# Patient Record
Sex: MALE | Race: WHITE | NOT HISPANIC OR LATINO | Employment: STUDENT | URBAN - METROPOLITAN AREA
[De-identification: names, ages, dates, MRNs, and addresses within clinical notes are randomized per-mention and may not be internally consistent; named-entity substitution may affect disease eponyms.]

---
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
## 2020-12-28 ENCOUNTER — LAB VISIT (OUTPATIENT)
Dept: LAB | Facility: HOSPITAL | Age: 20
End: 2020-12-28
Attending: PEDIATRICS
Payer: COMMERCIAL

## 2020-12-28 DIAGNOSIS — E10.69 TYPE I DIABETES MELLITUS WITH HYPEROSMOLAR COMA: Primary | ICD-10-CM

## 2020-12-28 DIAGNOSIS — E06.3 CHRONIC LYMPHOCYTIC THYROIDITIS: ICD-10-CM

## 2020-12-28 DIAGNOSIS — E10.65 TYPE I DIABETES MELLITUS WITH HYPEROSMOLAR COMA: Primary | ICD-10-CM

## 2020-12-28 DIAGNOSIS — E87.0 TYPE I DIABETES MELLITUS WITH HYPEROSMOLAR COMA: Primary | ICD-10-CM

## 2020-12-28 LAB
ALBUMIN SERPL BCP-MCNC: 4.6 G/DL (ref 3.5–5.2)
ALBUMIN/CREAT UR: 9.1 UG/MG (ref 0–30)
ALP SERPL-CCNC: 62 U/L (ref 55–135)
ALT SERPL W/O P-5'-P-CCNC: 12 U/L (ref 10–44)
ANION GAP SERPL CALC-SCNC: 9 MMOL/L (ref 8–16)
AST SERPL-CCNC: 14 U/L (ref 10–40)
BACTERIA #/AREA URNS HPF: NORMAL /HPF
BILIRUB SERPL-MCNC: 0.9 MG/DL (ref 0.1–1)
BILIRUB UR QL STRIP: NEGATIVE
BUN SERPL-MCNC: 16 MG/DL (ref 6–20)
CALCIUM SERPL-MCNC: 10 MG/DL (ref 8.7–10.5)
CHLORIDE SERPL-SCNC: 103 MMOL/L (ref 95–110)
CHOLEST SERPL-MCNC: 227 MG/DL (ref 120–199)
CHOLEST/HDLC SERPL: 3.6 {RATIO} (ref 2–5)
CLARITY UR: CLEAR
CO2 SERPL-SCNC: 29 MMOL/L (ref 23–29)
COLOR UR: YELLOW
CREAT SERPL-MCNC: 0.9 MG/DL (ref 0.5–1.4)
CREAT UR-MCNC: 110 MG/DL (ref 23–375)
EST. GFR  (AFRICAN AMERICAN): >60 ML/MIN/1.73 M^2
EST. GFR  (NON AFRICAN AMERICAN): >60 ML/MIN/1.73 M^2
ESTIMATED AVG GLUCOSE: 192 MG/DL (ref 68–131)
GLUCOSE SERPL-MCNC: 89 MG/DL (ref 70–110)
GLUCOSE UR QL STRIP: ABNORMAL
HBA1C MFR BLD HPLC: 8.3 % (ref 4–5.6)
HDLC SERPL-MCNC: 63 MG/DL (ref 40–75)
HDLC SERPL: 27.8 % (ref 20–50)
HGB UR QL STRIP: NEGATIVE
IGA SERPL-MCNC: 98 MG/DL (ref 40–350)
KETONES UR QL STRIP: NEGATIVE
LDLC SERPL CALC-MCNC: 156.4 MG/DL (ref 63–159)
LEUKOCYTE ESTERASE UR QL STRIP: NEGATIVE
MICROALBUMIN UR DL<=1MG/L-MCNC: 10 UG/ML
MICROSCOPIC COMMENT: NORMAL
NITRITE UR QL STRIP: NEGATIVE
NONHDLC SERPL-MCNC: 164 MG/DL
PH UR STRIP: 6 [PH] (ref 5–8)
POTASSIUM SERPL-SCNC: 4.1 MMOL/L (ref 3.5–5.1)
PROT SERPL-MCNC: 7.4 G/DL (ref 6–8.4)
PROT UR QL STRIP: NEGATIVE
SODIUM SERPL-SCNC: 141 MMOL/L (ref 136–145)
SP GR UR STRIP: 1.02 (ref 1–1.03)
T4 FREE SERPL-MCNC: 0.96 NG/DL (ref 0.71–1.51)
TRIGL SERPL-MCNC: 38 MG/DL (ref 30–150)
TSH SERPL DL<=0.005 MIU/L-ACNC: 0.78 UIU/ML (ref 0.4–4)
URN SPEC COLLECT METH UR: ABNORMAL
UROBILINOGEN UR STRIP-ACNC: NEGATIVE EU/DL
YEAST URNS QL MICRO: NORMAL

## 2020-12-28 PROCEDURE — 80053 COMPREHEN METABOLIC PANEL: CPT

## 2020-12-28 PROCEDURE — 84439 ASSAY OF FREE THYROXINE: CPT

## 2020-12-28 PROCEDURE — 82784 ASSAY IGA/IGD/IGG/IGM EACH: CPT

## 2020-12-28 PROCEDURE — 82043 UR ALBUMIN QUANTITATIVE: CPT

## 2020-12-28 PROCEDURE — 83516 IMMUNOASSAY NONANTIBODY: CPT

## 2020-12-28 PROCEDURE — 80061 LIPID PANEL: CPT

## 2020-12-28 PROCEDURE — 81000 URINALYSIS NONAUTO W/SCOPE: CPT

## 2020-12-28 PROCEDURE — 83036 HEMOGLOBIN GLYCOSYLATED A1C: CPT

## 2020-12-28 PROCEDURE — 82570 ASSAY OF URINE CREATININE: CPT

## 2020-12-28 PROCEDURE — 84443 ASSAY THYROID STIM HORMONE: CPT

## 2020-12-30 LAB — TTG IGA SER-ACNC: 5 UNITS

## 2021-08-03 ENCOUNTER — LAB VISIT (OUTPATIENT)
Dept: LAB | Facility: HOSPITAL | Age: 21
End: 2021-08-03
Attending: PEDIATRICS
Payer: COMMERCIAL

## 2021-08-03 DIAGNOSIS — E10.65 TYPE I DIABETES MELLITUS WITH HYPEROSMOLAR COMA: Primary | ICD-10-CM

## 2021-08-03 DIAGNOSIS — E06.3 CHRONIC LYMPHOCYTIC THYROIDITIS: ICD-10-CM

## 2021-08-03 DIAGNOSIS — E10.69 TYPE I DIABETES MELLITUS WITH HYPEROSMOLAR COMA: Primary | ICD-10-CM

## 2021-08-03 DIAGNOSIS — E87.0 TYPE I DIABETES MELLITUS WITH HYPEROSMOLAR COMA: Primary | ICD-10-CM

## 2021-08-03 LAB
ALBUMIN SERPL BCP-MCNC: 4.3 G/DL (ref 3.5–5.2)
ALP SERPL-CCNC: 49 U/L (ref 55–135)
ALT SERPL W/O P-5'-P-CCNC: 14 U/L (ref 10–44)
ANION GAP SERPL CALC-SCNC: 10 MMOL/L (ref 8–16)
AST SERPL-CCNC: 13 U/L (ref 10–40)
BILIRUB SERPL-MCNC: 1 MG/DL (ref 0.1–1)
BUN SERPL-MCNC: 19 MG/DL (ref 6–20)
CALCIUM SERPL-MCNC: 9.9 MG/DL (ref 8.7–10.5)
CHLORIDE SERPL-SCNC: 103 MMOL/L (ref 95–110)
CHOLEST SERPL-MCNC: 220 MG/DL (ref 120–199)
CHOLEST/HDLC SERPL: 3.6 {RATIO} (ref 2–5)
CO2 SERPL-SCNC: 29 MMOL/L (ref 23–29)
CREAT SERPL-MCNC: 1 MG/DL (ref 0.5–1.4)
EST. GFR  (AFRICAN AMERICAN): >60 ML/MIN/1.73 M^2
EST. GFR  (NON AFRICAN AMERICAN): >60 ML/MIN/1.73 M^2
ESTIMATED AVG GLUCOSE: 220 MG/DL (ref 68–131)
GLUCOSE SERPL-MCNC: 138 MG/DL (ref 70–110)
HBA1C MFR BLD: 9.3 % (ref 4–5.6)
HDLC SERPL-MCNC: 61 MG/DL (ref 40–75)
HDLC SERPL: 27.7 % (ref 20–50)
IGA SERPL-MCNC: 115 MG/DL (ref 40–350)
LDLC SERPL CALC-MCNC: 146.6 MG/DL (ref 63–159)
NONHDLC SERPL-MCNC: 159 MG/DL
POTASSIUM SERPL-SCNC: 3.8 MMOL/L (ref 3.5–5.1)
PROT SERPL-MCNC: 6.8 G/DL (ref 6–8.4)
SODIUM SERPL-SCNC: 142 MMOL/L (ref 136–145)
T4 FREE SERPL-MCNC: 0.92 NG/DL (ref 0.71–1.51)
THYROGLOB AB SERPL IA-ACNC: 28.8 IU/ML (ref 0–3.9)
THYROPEROXIDASE IGG SERPL-ACNC: 35.8 IU/ML
TRIGL SERPL-MCNC: 62 MG/DL (ref 30–150)
TSH SERPL DL<=0.005 MIU/L-ACNC: 1.32 UIU/ML (ref 0.4–4)

## 2021-08-03 PROCEDURE — 83036 HEMOGLOBIN GLYCOSYLATED A1C: CPT | Performed by: PEDIATRICS

## 2021-08-03 PROCEDURE — 84439 ASSAY OF FREE THYROXINE: CPT | Performed by: PEDIATRICS

## 2021-08-03 PROCEDURE — 83516 IMMUNOASSAY NONANTIBODY: CPT | Performed by: PEDIATRICS

## 2021-08-03 PROCEDURE — 84443 ASSAY THYROID STIM HORMONE: CPT | Performed by: PEDIATRICS

## 2021-08-03 PROCEDURE — 82784 ASSAY IGA/IGD/IGG/IGM EACH: CPT | Performed by: PEDIATRICS

## 2021-08-03 PROCEDURE — 80061 LIPID PANEL: CPT | Performed by: PEDIATRICS

## 2021-08-03 PROCEDURE — 80053 COMPREHEN METABOLIC PANEL: CPT | Performed by: PEDIATRICS

## 2021-08-03 PROCEDURE — 86800 THYROGLOBULIN ANTIBODY: CPT | Performed by: PEDIATRICS

## 2021-08-03 PROCEDURE — 36415 COLL VENOUS BLD VENIPUNCTURE: CPT | Performed by: PEDIATRICS

## 2021-08-03 PROCEDURE — 86376 MICROSOMAL ANTIBODY EACH: CPT | Performed by: PEDIATRICS

## 2021-08-06 LAB — TTG IGA SER-ACNC: 7 UNITS

## 2023-12-13 NOTE — PROGRESS NOTES
"[facility]   OFFICE VISIT NOTE    Patient Name: Dario Shepherd  YOB: 2000    PRESENTING HISTORY     History of Present Illness:  Mr. Dario Shepherd is a 23 y.o. male here to establish care  History notable for type 1 diabetes mellitus which was diagnosed when he was about 12 years old.    Used to follow up pediatrician as well as pediatric endocrinologist, however, due to his age, he was advised to follow up with all adult provider as well as adult endocrinologist.    He has insulin pump and uses Humalog 1 units for every 10 g of carbs he consumes.   Denies any recent hypoglycemic events. Last one was about 5 years ago.  Denies any vision changes, polyphagia, polydipsia, or polyuria.    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    Not currently sexually active and declines STD testing today.    Denies smoking or illicit drug use  Admits drinking on weekends -- couple of beers     Review of Systems   Constitutional:  Negative for chills, diaphoresis, fatigue and fever.   Respiratory:  Negative for cough, shortness of breath and wheezing.    Cardiovascular:  Negative for chest pain and palpitations.   Gastrointestinal:  Negative for constipation, diarrhea, nausea and vomiting.   Genitourinary:  Negative for bladder incontinence.   Neurological:  Negative for coordination difficulties.   Psychiatric/Behavioral:  Negative for behavioral problems.           OBJECTIVE:   Vital Signs:  Vitals:    12/14/23 0759   BP: 110/74   Pulse: 88   Resp: 18   SpO2: 99%   Weight: 74.1 kg (163 lb 5.8 oz)   Height: 5' 9" (1.753 m)         Physical Exam  Constitutional:       General: He is not in acute distress.     Appearance: He is not ill-appearing or toxic-appearing.   HENT:      Head: Normocephalic and atraumatic.      Mouth/Throat:      Mouth: Mucous membranes " are moist.      Pharynx: Uvula midline. No pharyngeal swelling.   Cardiovascular:      Rate and Rhythm: Normal rate and regular rhythm.   Pulmonary:      Effort: Pulmonary effort is normal. No tachypnea, bradypnea, accessory muscle usage, prolonged expiration or respiratory distress.      Breath sounds: Normal breath sounds. No stridor. No wheezing, rhonchi or rales.   Neurological:      General: No focal deficit present.      Mental Status: He is alert.   Psychiatric:         Mood and Affect: Mood normal.         Behavior: Behavior normal.         ASSESSMENT & PLAN:     Routine general medical examination at a health care facility  -     CBC Without Differential; Future; Expected date: 12/14/2023  -     Comprehensive Metabolic Panel; Future; Expected date: 12/14/2023  -     Hemoglobin A1C; Future; Expected date: 12/14/2023  -     Lipid Panel; Future; Expected date: 12/14/2023  A standard drink is equal to 14.0 grams (0.6 ounces) of pure alcohol. Generally, this amount of pure alcohol is found in  12 ounces of beer (5% alcohol content).  8 ounces of malt liquor (7% alcohol content).  5 ounces of wine (12% alcohol content).  1.5 ounces or a shot of 80-proof (40% alcohol content) distilled spirits or liquor (e.g., gin, rum, vodka, whiskey).    According to the Dietary Guidelines for Americans, adults of legal drinking age can choose not to drink, or to drink in moderation by limiting intake to 2 drinks or less in a day for men and 1 drink or less in a day for women, when alcohol is consumed.     Type 1 diabetes mellitus with hyperglycemia  -     Hemoglobin A1C; Future; Expected date: 12/14/2023  -     Lipid Panel; Future; Expected date: 12/14/2023  -     Ambulatory referral/consult to Endocrinology; Future; Expected date: 12/21/2023    Continue with insulin pump and humalog as needed   Prescribed glucagon emergency kit for the patient   Endocrinology referral  Informed the patient that it may be several months  waiting until he is seen by endocrinology -- will utilize e-consult if needed     Other orders  -     insulin lispro (HUMALOG U-100 INSULIN) 100 unit/mL injection; Inject 1 unit subcutaneously for 10 g of carbohydrates  Dispense: 10 mL; Refill: 10  -     glucagon HCL (GLUCAGON, HCL, EMERGENCY KIT) 1 mg SolR; Inject 1 mg subcutaneously into the upper arm, thigh, or buttocks for hypoglycemia (glucose < 60)  Dispense: 1 each; Refill: 2         Janell Richardson MD  Family Medicine  [facility]     This note was created using [employer] voice recognition software that occasionally misinterprets phrases or words

## 2023-12-14 ENCOUNTER — LAB VISIT (OUTPATIENT)
Dept: LAB | Facility: HOSPITAL | Age: 23
End: 2023-12-14
Attending: STUDENT IN AN ORGANIZED HEALTH CARE EDUCATION/TRAINING PROGRAM
Payer: COMMERCIAL

## 2023-12-14 ENCOUNTER — TELEPHONE (OUTPATIENT)
Dept: ENDOCRINOLOGY | Facility: CLINIC | Age: 23
End: 2023-12-14
Payer: COMMERCIAL

## 2023-12-14 ENCOUNTER — OFFICE VISIT (OUTPATIENT)
Dept: FAMILY MEDICINE | Facility: CLINIC | Age: 23
End: 2023-12-14
Payer: COMMERCIAL

## 2023-12-14 VITALS
SYSTOLIC BLOOD PRESSURE: 110 MMHG | HEART RATE: 88 BPM | WEIGHT: 163.38 LBS | OXYGEN SATURATION: 99 % | DIASTOLIC BLOOD PRESSURE: 74 MMHG | HEIGHT: 69 IN | RESPIRATION RATE: 18 BRPM | BODY MASS INDEX: 24.2 KG/M2

## 2023-12-14 DIAGNOSIS — E10.65 TYPE 1 DIABETES MELLITUS WITH HYPERGLYCEMIA: ICD-10-CM

## 2023-12-14 DIAGNOSIS — Z00.00 ROUTINE GENERAL MEDICAL EXAMINATION AT A HEALTH CARE FACILITY: ICD-10-CM

## 2023-12-14 DIAGNOSIS — Z00.00 ROUTINE GENERAL MEDICAL EXAMINATION AT A HEALTH CARE FACILITY: Primary | ICD-10-CM

## 2023-12-14 LAB
ALBUMIN SERPL BCP-MCNC: 4.2 G/DL (ref 3.5–5.2)
ALP SERPL-CCNC: 47 U/L (ref 55–135)
ALT SERPL W/O P-5'-P-CCNC: 30 U/L (ref 10–44)
ANION GAP SERPL CALC-SCNC: 11 MMOL/L (ref 8–16)
AST SERPL-CCNC: 23 U/L (ref 10–40)
BILIRUB SERPL-MCNC: 1.3 MG/DL (ref 0.1–1)
BUN SERPL-MCNC: 16 MG/DL (ref 6–20)
CALCIUM SERPL-MCNC: 10.4 MG/DL (ref 8.7–10.5)
CHLORIDE SERPL-SCNC: 100 MMOL/L (ref 95–110)
CHOLEST SERPL-MCNC: 201 MG/DL (ref 120–199)
CHOLEST/HDLC SERPL: 3 {RATIO} (ref 2–5)
CO2 SERPL-SCNC: 30 MMOL/L (ref 23–29)
CREAT SERPL-MCNC: 1 MG/DL (ref 0.5–1.4)
ERYTHROCYTE [DISTWIDTH] IN BLOOD BY AUTOMATED COUNT: 12.4 % (ref 11.5–14.5)
EST. GFR  (NO RACE VARIABLE): >60 ML/MIN/1.73 M^2
ESTIMATED AVG GLUCOSE: 203 MG/DL (ref 68–131)
GLUCOSE SERPL-MCNC: 156 MG/DL (ref 70–110)
HBA1C MFR BLD: 8.7 % (ref 4–5.6)
HCT VFR BLD AUTO: 52.7 % (ref 40–54)
HDLC SERPL-MCNC: 67 MG/DL (ref 40–75)
HDLC SERPL: 33.3 % (ref 20–50)
HGB BLD-MCNC: 17.7 G/DL (ref 14–18)
LDLC SERPL CALC-MCNC: 122.4 MG/DL (ref 63–159)
MCH RBC QN AUTO: 30.7 PG (ref 27–31)
MCHC RBC AUTO-ENTMCNC: 33.6 G/DL (ref 32–36)
MCV RBC AUTO: 91 FL (ref 82–98)
NONHDLC SERPL-MCNC: 134 MG/DL
PLATELET # BLD AUTO: 247 K/UL (ref 150–450)
PMV BLD AUTO: 11.9 FL (ref 9.2–12.9)
POTASSIUM SERPL-SCNC: 4.1 MMOL/L (ref 3.5–5.1)
PROT SERPL-MCNC: 7.1 G/DL (ref 6–8.4)
RBC # BLD AUTO: 5.77 M/UL (ref 4.6–6.2)
SODIUM SERPL-SCNC: 141 MMOL/L (ref 136–145)
TRIGL SERPL-MCNC: 58 MG/DL (ref 30–150)
WBC # BLD AUTO: 6.67 K/UL (ref 3.9–12.7)

## 2023-12-14 PROCEDURE — 80061 LIPID PANEL: CPT | Performed by: STUDENT IN AN ORGANIZED HEALTH CARE EDUCATION/TRAINING PROGRAM

## 2023-12-14 PROCEDURE — 3078F DIAST BP <80 MM HG: CPT | Mod: CPTII,S$GLB,, | Performed by: STUDENT IN AN ORGANIZED HEALTH CARE EDUCATION/TRAINING PROGRAM

## 2023-12-14 PROCEDURE — 80053 COMPREHEN METABOLIC PANEL: CPT | Performed by: STUDENT IN AN ORGANIZED HEALTH CARE EDUCATION/TRAINING PROGRAM

## 2023-12-14 PROCEDURE — 83036 HEMOGLOBIN GLYCOSYLATED A1C: CPT | Performed by: STUDENT IN AN ORGANIZED HEALTH CARE EDUCATION/TRAINING PROGRAM

## 2023-12-14 PROCEDURE — 99999 PR PBB SHADOW E&M-EST. PATIENT-LVL IV: ICD-10-PCS | Mod: PBBFAC,,, | Performed by: STUDENT IN AN ORGANIZED HEALTH CARE EDUCATION/TRAINING PROGRAM

## 2023-12-14 PROCEDURE — 85027 COMPLETE CBC AUTOMATED: CPT | Performed by: STUDENT IN AN ORGANIZED HEALTH CARE EDUCATION/TRAINING PROGRAM

## 2023-12-14 PROCEDURE — 1159F MED LIST DOCD IN RCRD: CPT | Mod: CPTII,S$GLB,, | Performed by: STUDENT IN AN ORGANIZED HEALTH CARE EDUCATION/TRAINING PROGRAM

## 2023-12-14 PROCEDURE — 99385 PREV VISIT NEW AGE 18-39: CPT | Mod: S$GLB,,, | Performed by: STUDENT IN AN ORGANIZED HEALTH CARE EDUCATION/TRAINING PROGRAM

## 2023-12-14 PROCEDURE — 3074F SYST BP LT 130 MM HG: CPT | Mod: CPTII,S$GLB,, | Performed by: STUDENT IN AN ORGANIZED HEALTH CARE EDUCATION/TRAINING PROGRAM

## 2023-12-14 PROCEDURE — 1160F PR REVIEW ALL MEDS BY PRESCRIBER/CLIN PHARMACIST DOCUMENTED: ICD-10-PCS | Mod: CPTII,S$GLB,, | Performed by: STUDENT IN AN ORGANIZED HEALTH CARE EDUCATION/TRAINING PROGRAM

## 2023-12-14 PROCEDURE — 1160F RVW MEDS BY RX/DR IN RCRD: CPT | Mod: CPTII,S$GLB,, | Performed by: STUDENT IN AN ORGANIZED HEALTH CARE EDUCATION/TRAINING PROGRAM

## 2023-12-14 PROCEDURE — 3074F PR MOST RECENT SYSTOLIC BLOOD PRESSURE < 130 MM HG: ICD-10-PCS | Mod: CPTII,S$GLB,, | Performed by: STUDENT IN AN ORGANIZED HEALTH CARE EDUCATION/TRAINING PROGRAM

## 2023-12-14 PROCEDURE — 99385 PR PREVENTIVE VISIT,NEW,18-39: ICD-10-PCS | Mod: S$GLB,,, | Performed by: STUDENT IN AN ORGANIZED HEALTH CARE EDUCATION/TRAINING PROGRAM

## 2023-12-14 PROCEDURE — 99999 PR PBB SHADOW E&M-EST. PATIENT-LVL IV: CPT | Mod: PBBFAC,,, | Performed by: STUDENT IN AN ORGANIZED HEALTH CARE EDUCATION/TRAINING PROGRAM

## 2023-12-14 PROCEDURE — 3078F PR MOST RECENT DIASTOLIC BLOOD PRESSURE < 80 MM HG: ICD-10-PCS | Mod: CPTII,S$GLB,, | Performed by: STUDENT IN AN ORGANIZED HEALTH CARE EDUCATION/TRAINING PROGRAM

## 2023-12-14 PROCEDURE — 1159F PR MEDICATION LIST DOCUMENTED IN MEDICAL RECORD: ICD-10-PCS | Mod: CPTII,S$GLB,, | Performed by: STUDENT IN AN ORGANIZED HEALTH CARE EDUCATION/TRAINING PROGRAM

## 2023-12-14 PROCEDURE — 3008F BODY MASS INDEX DOCD: CPT | Mod: CPTII,S$GLB,, | Performed by: STUDENT IN AN ORGANIZED HEALTH CARE EDUCATION/TRAINING PROGRAM

## 2023-12-14 PROCEDURE — 36415 COLL VENOUS BLD VENIPUNCTURE: CPT | Mod: PO | Performed by: STUDENT IN AN ORGANIZED HEALTH CARE EDUCATION/TRAINING PROGRAM

## 2023-12-14 PROCEDURE — 3008F PR BODY MASS INDEX (BMI) DOCUMENTED: ICD-10-PCS | Mod: CPTII,S$GLB,, | Performed by: STUDENT IN AN ORGANIZED HEALTH CARE EDUCATION/TRAINING PROGRAM

## 2023-12-14 RX ORDER — INSULIN LISPRO 100 [IU]/ML
INJECTION, SOLUTION INTRAVENOUS; SUBCUTANEOUS
Qty: 10 ML | Refills: 10 | Status: SHIPPED | OUTPATIENT
Start: 2023-12-14

## 2023-12-14 RX ORDER — GLUCAGON HCL 1 MG
VIAL (EA) INJECTION
Qty: 1 EACH | Refills: 2 | Status: SHIPPED | OUTPATIENT
Start: 2023-12-14

## 2023-12-14 RX ORDER — INSULIN PUMP CONTROLLER
EACH MISCELLANEOUS
COMMUNITY
Start: 2023-09-27

## 2023-12-14 RX ORDER — INSULIN LISPRO 100 [IU]/ML
INJECTION, SOLUTION INTRAVENOUS; SUBCUTANEOUS
COMMUNITY
Start: 2023-10-30 | End: 2023-12-14

## 2023-12-14 NOTE — PATIENT INSTRUCTIONS
Marcio Bishop,     If you are due for any health screening(s) below please notify me so we can arrange them to be ordered and scheduled. Most healthy patients at your age complete them, but you are free to accept or refuse.     If you can't do it, I'll definitely understand. If you can, I'd certainly appreciate it!    All of your core healthy metrics are met.      Lets manage your A1c levels     Your last hemoglobin A1c was higher than the goal of less than 8. Hemoglobin A1c or HbA1c is a blood test that measures your average blood sugar levels over the past 3 months. It is the main test to help you and your health care team manage your diabetes.     Higher A1c levels are linked to diabetes complications, such as loss of vision, kidney disease, and nerve damage. Keeping your A1c at least less than 8 is important to stay healthy and we are here to help. Talk with your provider on how you can further improve your A1c.     We recommend that you set up blood work to get a repeat hemoglobin A1c in 3 months to monitor your diabetes. Let your health care team know if you have questions.

## 2023-12-14 NOTE — TELEPHONE ENCOUNTER
Endo referral: patient's phones are mom/dad. Scheduled with Emily acevedo: 3/7/2024 [address]. No portal access. Mom asks for email sent. Explained however patient has notifications set up, he'll receive appointment information in that manner. Offered to call her back and leave voicemail with appointment information. She accepts; voicemail left.

## 2023-12-19 ENCOUNTER — TELEPHONE (OUTPATIENT)
Dept: FAMILY MEDICINE | Facility: CLINIC | Age: 23
End: 2023-12-19
Payer: COMMERCIAL

## 2023-12-19 DIAGNOSIS — E10.65 TYPE 1 DIABETES MELLITUS WITH HYPERGLYCEMIA: Primary | ICD-10-CM

## 2023-12-19 RX ORDER — GLUCAGON 3 MG/1
1 POWDER NASAL ONCE
Qty: 1 EACH | Refills: 1 | Status: SHIPPED | OUTPATIENT
Start: 2023-12-19 | End: 2023-12-19

## 2023-12-19 NOTE — TELEPHONE ENCOUNTER
Called the patient to let him know that glucagon emergency kit was not covered through the insurance and that I sent in the alternative (glucagon nasal spray) to his pharmacy.  He did not  so I left a voicemail.

## 2024-05-17 ENCOUNTER — OFFICE VISIT (OUTPATIENT)
Dept: FAMILY MEDICINE | Facility: CLINIC | Age: 24
End: 2024-05-17
Payer: COMMERCIAL

## 2024-05-17 ENCOUNTER — TELEPHONE (OUTPATIENT)
Dept: FAMILY MEDICINE | Facility: CLINIC | Age: 24
End: 2024-05-17

## 2024-05-17 ENCOUNTER — LAB VISIT (OUTPATIENT)
Dept: LAB | Facility: HOSPITAL | Age: 24
End: 2024-05-17
Attending: STUDENT IN AN ORGANIZED HEALTH CARE EDUCATION/TRAINING PROGRAM
Payer: COMMERCIAL

## 2024-05-17 VITALS
OXYGEN SATURATION: 98 % | WEIGHT: 171.75 LBS | BODY MASS INDEX: 25.44 KG/M2 | SYSTOLIC BLOOD PRESSURE: 138 MMHG | HEIGHT: 69 IN | HEART RATE: 103 BPM | RESPIRATION RATE: 16 BRPM | DIASTOLIC BLOOD PRESSURE: 76 MMHG

## 2024-05-17 DIAGNOSIS — E10.65 TYPE 1 DIABETES MELLITUS WITH HYPERGLYCEMIA: ICD-10-CM

## 2024-05-17 DIAGNOSIS — R17 HIGH BILIRUBIN: ICD-10-CM

## 2024-05-17 DIAGNOSIS — E10.65 TYPE 1 DIABETES MELLITUS WITH HYPERGLYCEMIA: Primary | ICD-10-CM

## 2024-05-17 LAB
ALBUMIN SERPL BCP-MCNC: 4.3 G/DL (ref 3.5–5.2)
ALP SERPL-CCNC: 51 U/L (ref 55–135)
ALT SERPL W/O P-5'-P-CCNC: 19 U/L (ref 10–44)
ANION GAP SERPL CALC-SCNC: 11 MMOL/L (ref 8–16)
AST SERPL-CCNC: 15 U/L (ref 10–40)
BILIRUB DIRECT SERPL-MCNC: 0.2 MG/DL (ref 0.1–0.3)
BILIRUB SERPL-MCNC: 0.4 MG/DL (ref 0.1–1)
BUN SERPL-MCNC: 14 MG/DL (ref 6–20)
CALCIUM SERPL-MCNC: 9.9 MG/DL (ref 8.7–10.5)
CHLORIDE SERPL-SCNC: 100 MMOL/L (ref 95–110)
CO2 SERPL-SCNC: 26 MMOL/L (ref 23–29)
CREAT SERPL-MCNC: 1.2 MG/DL (ref 0.5–1.4)
EST. GFR  (NO RACE VARIABLE): >60 ML/MIN/1.73 M^2
ESTIMATED AVG GLUCOSE: 183 MG/DL (ref 68–131)
GLUCOSE SERPL-MCNC: 282 MG/DL (ref 70–110)
HBA1C MFR BLD: 8 % (ref 4–5.6)
POTASSIUM SERPL-SCNC: 4.2 MMOL/L (ref 3.5–5.1)
PROT SERPL-MCNC: 7 G/DL (ref 6–8.4)
SODIUM SERPL-SCNC: 137 MMOL/L (ref 136–145)

## 2024-05-17 PROCEDURE — 36415 COLL VENOUS BLD VENIPUNCTURE: CPT | Mod: PO | Performed by: STUDENT IN AN ORGANIZED HEALTH CARE EDUCATION/TRAINING PROGRAM

## 2024-05-17 PROCEDURE — 3008F BODY MASS INDEX DOCD: CPT | Mod: CPTII,S$GLB,, | Performed by: STUDENT IN AN ORGANIZED HEALTH CARE EDUCATION/TRAINING PROGRAM

## 2024-05-17 PROCEDURE — 99213 OFFICE O/P EST LOW 20 MIN: CPT | Mod: S$GLB,,, | Performed by: STUDENT IN AN ORGANIZED HEALTH CARE EDUCATION/TRAINING PROGRAM

## 2024-05-17 PROCEDURE — 80053 COMPREHEN METABOLIC PANEL: CPT | Performed by: STUDENT IN AN ORGANIZED HEALTH CARE EDUCATION/TRAINING PROGRAM

## 2024-05-17 PROCEDURE — 1160F RVW MEDS BY RX/DR IN RCRD: CPT | Mod: CPTII,S$GLB,, | Performed by: STUDENT IN AN ORGANIZED HEALTH CARE EDUCATION/TRAINING PROGRAM

## 2024-05-17 PROCEDURE — 99999 PR PBB SHADOW E&M-EST. PATIENT-LVL IV: CPT | Mod: PBBFAC,,, | Performed by: STUDENT IN AN ORGANIZED HEALTH CARE EDUCATION/TRAINING PROGRAM

## 2024-05-17 PROCEDURE — 3075F SYST BP GE 130 - 139MM HG: CPT | Mod: CPTII,S$GLB,, | Performed by: STUDENT IN AN ORGANIZED HEALTH CARE EDUCATION/TRAINING PROGRAM

## 2024-05-17 PROCEDURE — 3078F DIAST BP <80 MM HG: CPT | Mod: CPTII,S$GLB,, | Performed by: STUDENT IN AN ORGANIZED HEALTH CARE EDUCATION/TRAINING PROGRAM

## 2024-05-17 PROCEDURE — 82248 BILIRUBIN DIRECT: CPT | Performed by: STUDENT IN AN ORGANIZED HEALTH CARE EDUCATION/TRAINING PROGRAM

## 2024-05-17 PROCEDURE — 83036 HEMOGLOBIN GLYCOSYLATED A1C: CPT | Performed by: STUDENT IN AN ORGANIZED HEALTH CARE EDUCATION/TRAINING PROGRAM

## 2024-05-17 PROCEDURE — 1159F MED LIST DOCD IN RCRD: CPT | Mod: CPTII,S$GLB,, | Performed by: STUDENT IN AN ORGANIZED HEALTH CARE EDUCATION/TRAINING PROGRAM

## 2024-05-17 RX ORDER — INSULIN PUMP CONTROLLER
1 EACH MISCELLANEOUS
Qty: 50 EACH | Refills: 2 | Status: SHIPPED | OUTPATIENT
Start: 2024-05-17 | End: 2024-05-20 | Stop reason: SDUPTHER

## 2024-05-17 RX ORDER — INSULIN LISPRO 100 [IU]/ML
INJECTION, SOLUTION INTRAVENOUS; SUBCUTANEOUS
Qty: 30 ML | Refills: 3 | Status: SHIPPED | OUTPATIENT
Start: 2024-05-17 | End: 2024-05-20 | Stop reason: SDUPTHER

## 2024-05-17 NOTE — TELEPHONE ENCOUNTER
Please see pharmacy note   Jessie needs to clarify the max daily amount of units for patients insulin  Please advise      ----- Message from Feliz Cuevas sent at 5/17/2024  2:49 PM CDT -----  Contact: Jessie  Type: Needs Medical Advice  Who Called:  Jessie    Pharmacy name and phone #:    [id number] - [place] - [address] [facility] [address]  [facility] [place] [address] [address]  Phone: [phone] Fax: [phone]    Additional Information: Jessie needs to clarify the max daily amount of units for patients insulin

## 2024-05-17 NOTE — TELEPHONE ENCOUNTER
Patient returned call to office. Call transferred directly to writer per patient access representative Mac Tellez.  Patient states he received Lispro through [facility] (only method of receiving this medication).  Patient confirmed his maximum daily dosage is 65 units.  Will send follow up message to Dr. Richardson for notification.  Call placed to pharmacy, spoke to pharmacist (Haydee) for clarification of order as stated above.

## 2024-05-17 NOTE — TELEPHONE ENCOUNTER
I tried calling him on 5/17 afternoon but was not able to reach him I left a   His phone is [phone]    Please call and ask him maximum insulin lispro he is using per day (he is doing 1 unit for every 10 g of carbs).    Also ask maximum insulin he is getting a dayon omnipod.    Thank you,  Janell Richardson MD

## 2024-05-17 NOTE — PROGRESS NOTES
"[facility] [place]   OFFICE VISIT NOTE    Patient Name: Dario Shepherd  YOB: 2000    PRESENTING HISTORY     History of Present Illness:  Mr. Dario Shepherd is a 23 y.o. male here for medication refill for type 1 diabetes     Review of Systems   Constitutional:  Negative for chills and fever.   Respiratory:  Negative for shortness of breath.    Cardiovascular:  Negative for chest pain.   Gastrointestinal:  Negative for blood in stool.   Endocrine: Negative for polydipsia, polyphagia and polyuria.   Genitourinary:  Negative for hematuria.          OBJECTIVE:   Vital Signs:  Vitals:    05/17/24 1345   BP: 138/76   Pulse: 103   Resp: 16   SpO2: 98%   Weight: 77.9 kg (171 lb 11.8 oz)   Height: 5' 9" (1.753 m)           Physical Exam  Constitutional:       General: He is not in acute distress.     Appearance: He is not ill-appearing or toxic-appearing.   HENT:      Head: Normocephalic and atraumatic.      Mouth/Throat:      Mouth: Mucous membranes are moist.      Pharynx: Uvula midline. No pharyngeal swelling.   Cardiovascular:      Rate and Rhythm: Normal rate and regular rhythm.   Pulmonary:      Effort: Pulmonary effort is normal. No tachypnea, bradypnea, accessory muscle usage, prolonged expiration or respiratory distress.      Breath sounds: Normal breath sounds. No stridor. No wheezing, rhonchi or rales.   Abdominal:      General: Bowel sounds are normal.      Palpations: Abdomen is soft.      Tenderness: There is no abdominal tenderness. There is no guarding or rebound.   Neurological:      General: No focal deficit present.      Mental Status: He is alert.   Psychiatric:         Mood and Affect: Mood normal.         Behavior: Behavior normal.         ASSESSMENT & PLAN:     Type 1 diabetes mellitus with hyperglycemia  -     HEMOGLOBIN A1C; Future; Expected date: 05/17/2024  -     COMPREHENSIVE METABOLIC PANEL; Future; Expected date: 05/17/2024  -     OMNIPOD DASH PODS, GEN 4, Crtg; " Inject 1 each into the skin every 7 days.  Dispense: 50 each; Refill: 2  -     insulin lispro (HUMALOG U-100 INSULIN) 100 unit/mL injection; Inject 1 unit subcutaneously for 10 g of carbohydrates  Dispense: 30 mL; Refill: 3  Patient has been using his insulin pump   Denies any hypoglycemic events  Denies any polyphagia, polydipsia, polyuria, or vision changes   Advised the patient to get routine eye exam     High bilirubin  -     COMPREHENSIVE METABOLIC PANEL; Future; Expected date: 05/17/2024  -     BILIRUBIN, DIRECT; Future; Expected date: 05/17/2024  On the last lab work, bilirubin was slightly elevated  Will repeat the bilirubin level today     Follow up in 6 months or sooner if needed        Janell Richardson MD  Family Medicine  [facility]     This note was created using [employer] voice recognition software that occasionally misinterprets phrases or words

## 2024-05-20 RX ORDER — INSULIN PUMP CONTROLLER
1 EACH MISCELLANEOUS
Qty: 50 EACH | Refills: 6 | Status: SHIPPED | OUTPATIENT
Start: 2024-05-20

## 2024-05-20 RX ORDER — INSULIN LISPRO 100 [IU]/ML
INJECTION, SOLUTION INTRAVENOUS; SUBCUTANEOUS
Qty: 30 ML | Refills: 3 | Status: SHIPPED | OUTPATIENT
Start: 2024-05-20

## 2024-05-21 ENCOUNTER — TELEPHONE (OUTPATIENT)
Dept: FAMILY MEDICINE | Facility: CLINIC | Age: 24
End: 2024-05-21
Payer: COMMERCIAL

## 2024-05-21 NOTE — TELEPHONE ENCOUNTER
Spoke to patient   Notified that his insulin and Pods were sent to [facility] yesterday   Endo referral is in computer   Message sent to endo to assist with appt      ----- Message from Chin Dillard sent at 5/21/2024  2:42 PM CDT -----  Contact: Mom  Type:  Patient Returning Call    Who Called:  Mom  Who Left Message for Patient:  Carrie  Does the patient know what this is regarding?:  Yes  Best Call Back Number:  [phone]  Additional Information:

## 2024-07-30 ENCOUNTER — OFFICE VISIT (OUTPATIENT)
Dept: ENDOCRINOLOGY | Facility: CLINIC | Age: 24
End: 2024-07-30
Payer: COMMERCIAL

## 2024-07-30 VITALS
HEIGHT: 69 IN | DIASTOLIC BLOOD PRESSURE: 82 MMHG | BODY MASS INDEX: 25.44 KG/M2 | SYSTOLIC BLOOD PRESSURE: 142 MMHG | OXYGEN SATURATION: 97 % | WEIGHT: 171.75 LBS | HEART RATE: 86 BPM

## 2024-07-30 DIAGNOSIS — E10.65 TYPE 1 DIABETES MELLITUS WITH HYPERGLYCEMIA: Primary | ICD-10-CM

## 2024-07-30 DIAGNOSIS — Z46.81 INSULIN PUMP FITTING OR ADJUSTMENT: ICD-10-CM

## 2024-07-30 DIAGNOSIS — Z96.41 INSULIN PUMP STATUS: ICD-10-CM

## 2024-07-30 PROCEDURE — 3008F BODY MASS INDEX DOCD: CPT | Mod: CPTII,S$GLB,, | Performed by: PHYSICIAN ASSISTANT

## 2024-07-30 PROCEDURE — 99999 PR PBB SHADOW E&M-EST. PATIENT-LVL III: CPT | Mod: PBBFAC,,, | Performed by: PHYSICIAN ASSISTANT

## 2024-07-30 PROCEDURE — 3052F HG A1C>EQUAL 8.0%<EQUAL 9.0%: CPT | Mod: CPTII,S$GLB,, | Performed by: PHYSICIAN ASSISTANT

## 2024-07-30 PROCEDURE — 1159F MED LIST DOCD IN RCRD: CPT | Mod: CPTII,S$GLB,, | Performed by: PHYSICIAN ASSISTANT

## 2024-07-30 PROCEDURE — 3079F DIAST BP 80-89 MM HG: CPT | Mod: CPTII,S$GLB,, | Performed by: PHYSICIAN ASSISTANT

## 2024-07-30 PROCEDURE — 99204 OFFICE O/P NEW MOD 45 MIN: CPT | Mod: S$GLB,,, | Performed by: PHYSICIAN ASSISTANT

## 2024-07-30 PROCEDURE — 3077F SYST BP >= 140 MM HG: CPT | Mod: CPTII,S$GLB,, | Performed by: PHYSICIAN ASSISTANT

## 2024-07-30 PROCEDURE — 1160F RVW MEDS BY RX/DR IN RCRD: CPT | Mod: CPTII,S$GLB,, | Performed by: PHYSICIAN ASSISTANT

## 2024-07-30 RX ORDER — GLUCAGON 3 MG/1
POWDER NASAL
Qty: 2 EACH | Refills: 0 | Status: SHIPPED | OUTPATIENT
Start: 2024-07-30

## 2024-07-30 RX ORDER — INSULIN GLARGINE 100 [IU]/ML
INJECTION, SOLUTION SUBCUTANEOUS
Qty: 30 ML | Refills: 1 | Status: SHIPPED | OUTPATIENT
Start: 2024-07-30

## 2024-07-30 RX ORDER — INSULIN LISPRO 100 [IU]/ML
INJECTION, SOLUTION INTRAVENOUS; SUBCUTANEOUS
Qty: 90 ML | Refills: 3 | Status: SHIPPED | OUTPATIENT
Start: 2024-07-30

## 2024-07-30 NOTE — PROGRESS NOTES
CC: This 23 y.o. male presents for management of T1DM  and chronic conditions pending review including HTN, HLP    HPI: was diagnosed with T1DM in when he was 12. Admitted at dx and once when he was 14-15. Previously saw Dr. Chavarria. New to endocrine.  Did call EMS once for hypoglycemia.   Family hx of DM: dad (T2), gf (T1)  Fhx of thyroid disease: none  Denies missing doses of DM medication.   hypoglycemia at home: none  monitoring BG at home 4x qd:  Fastin-180          CHO: 8  ISF: 40  Correction Threshold: 190  Target: 130  IOB: 2.5 hrs    Diet:   BF-2 crossiants, hb  LH-2 ham & cheese sandwiches  DN-pizza rolls, chips  No snacks. Avoids sugary beverages.     Exercise: Very active playing baseball. .     Name of Device or Devices used by the patient: Omnipod Dash w/ Humalog   When did you start the current therapy you are on: 2 years ago  Frequency of changing site/infusion set/tubing/cartridges: 2 days  Frequency of changing CGM: n/a  Using bolus wizard:  no  Taking bolus with each meal: yes    Standards of Care:  [facility] exam:  [facility]  Podiatry exam: never  DE: at dx    Hashimoto's Disease  +fatigue,   No palpitations, tremors, wt changes,   Constipation, diarrhea.    PMHx, PSHx: reviewed in epic.  Social Hx: no E/T use. Coaches baseball. Crabbing on the weekend.     Wt Readings from Last 10 Encounters:   24 77.9 kg (171 lb 11.8 oz)   24 77.9 kg (171 lb 11.8 oz)   23 74.1 kg (163 lb 5.8 oz)   18 70.3 kg (155 lb) (63%, Z= 0.33)*   18 50.3 kg (111 lb) (4%, Z= -1.76)*   17 50.8 kg (111 lb 15.9 oz) (7%, Z= -1.49)*   04/03/15 50.8 kg (112 lb) (42%, Z= -0.19)*   13 43.2 kg (95 lb 3.2 oz) (38%, Z= -0.31)*   13 40.8 kg (90 lb) (32%, Z= -0.45)*   12 49.4 kg (109 lb) (91%, Z= 1.32)*     * Growth percentiles are based on [facility] (Boys, 2-20 Years) data.      ROS:   Gen: Appetite good, no weight gain or loss, denies fatigue and weakness.  Skin: Skin is  "intact and heals well, no rashes, no hair changes  Eyes: Denies visual disturbances  Resp: no SOB or HAMM, no cough  Cardiac: No palpitations, chest pain, no edema   GI: No nausea or vomiting, diarrhea, constipation, or abdominal pain.  /GYN: No nocturia, burning or pain.   MS/Neuro: Denies numbness/ tingling in BLE; Gait steady, speech clear  Psych: Denies drug/ETOH abuse, no hx of depression.  Other systems: negative.    BP (!) 142/82 (BP Method: Large (Manual))   Pulse 86   Ht 5' 9" (1.753 m)   Wt 77.9 kg (171 lb 11.8 oz)   SpO2 97%   BMI 25.36 kg/m²      PE:  GENERAL: Well developed, well nourished.  PSYCH: AAOx3, appropriate mood and affect, pleasant expression, conversant, appears relaxed, well groomed.   EYES: Conjunctiva, corneas clear  NECK: Supple, trachea midline,no thyromegaly or nodules  CHEST: Resp even and unlabored,   VASCULAR: DP pulses +2/4 bilaterally, no edema.  NEURO: Gait steady  SKIN: Skin warm and dry no acanthosis nigracans.  7/24  Foot Exam: no sores or macerations noted.     Protective Sensation (w/ 10 gram monofilament):  Right: Intact  Left: Intact    Visual Inspection:  Normal -  Bilateral and Nails Intact - without Evidence of Foot Deformity- Bilateral    Pedal Pulses:   Right: Present  Left: Present    Posterior Tibialis Pulses:   Right:Present  Left: Present     Vibratory Sensation  Right:Positive  Left:Positive     Personally reviewed labs below:    No visits with results within 1 Month(s) from this visit.   Latest known visit with results is:   Lab Visit on 05/17/2024   Component Date Value Ref Range Status    Hemoglobin A1C 05/17/2024 8.0 (H)  4.0 - 5.6 % Final    Comment: ADA Screening Guidelines:  5.7-6.4%  Consistent with prediabetes  >or=6.5%  Consistent with diabetes    High levels of fetal hemoglobin interfere with the HbA1C  assay. Heterozygous hemoglobin variants (HbS, HgC, etc)do  not significantly interfere with this assay.   However, presence of multiple variants " may affect accuracy.      Estimated Avg Glucose 05/17/2024 183 (H)  68 - 131 mg/dL Final    Sodium 05/17/2024 137  136 - 145 mmol/L Final    Potassium 05/17/2024 4.2  3.5 - 5.1 mmol/L Final    Chloride 05/17/2024 100  95 - 110 mmol/L Final    CO2 05/17/2024 26  23 - 29 mmol/L Final    Glucose 05/17/2024 282 (H)  70 - 110 mg/dL Final    BUN 05/17/2024 14  6 - 20 mg/dL Final    Creatinine 05/17/2024 1.2  0.5 - 1.4 mg/dL Final    Calcium 05/17/2024 9.9  8.7 - 10.5 mg/dL Final    Total Protein 05/17/2024 7.0  6.0 - 8.4 g/dL Final    Albumin 05/17/2024 4.3  3.5 - 5.2 g/dL Final    Total Bilirubin 05/17/2024 0.4  0.1 - 1.0 mg/dL Final    Comment: For infants and newborns, interpretation of results should be based  on gestational age, weight and in agreement with clinical  observations.    Premature Infant recommended reference ranges:  Up to 24 hours.............<8.0 mg/dL  Up to 48 hours............<12.0 mg/dL  3-5 days..................<15.0 mg/dL  6-29 days.................<15.0 mg/dL      Alkaline Phosphatase 05/17/2024 51 (L)  55 - 135 U/L Final    AST 05/17/2024 15  10 - 40 U/L Final    ALT 05/17/2024 19  10 - 44 U/L Final    eGFR 05/17/2024 >60.0  >60 mL/min/1.73 m^2 Final    Anion Gap 05/17/2024 11  8 - 16 mmol/L Final    Bilirubin, Direct 05/17/2024 0.2  0.1 - 0.3 mg/dL Final         ASSESSMENT and PLAN:    1. Type 1 diabetes mellitus with hyperglycemia  blood sugar diagnostic (BLOOD GLUCOSE TEST) Strp    insulin glargine U-100, Lantus, (LANTUS SOLOSTAR U-100 INSULIN) 100 unit/mL (3 mL) InPn pen    insulin lispro (HUMALOG U-100 INSULIN) 100 unit/mL injection    glucagon (BAQSIMI) 3 mg/actuation Spry    TSH    T4, Free    Microalbumin/Creatinine Ratio, Urine    Hemoglobin A1C      2. Insulin pump status        3. Insulin pump fitting or adjustment           T2DM with hyperglycemia  -A1c is above goal.   Goal ~6.5%.  No readings in pump.   Input readings and carbs.   Change target to 120-130.  Would benefit from  CGM/pump therapy in closed loop.   Declines changing bc his pump is still under warranty.   Declines CGM & DE.  Previously on enalapril. Will check mac.    Sent baqsimi.    Pump malfunction Instructions:  CHO: Humalog 1:8  Lantus: 30 units qhs    Discussed DM, progression of disease, long term complications, tx options.   Discussed A1c and BG goals.   Reviewed  hypoglycemia, s/s and appropriate tx.   Instructed to monitor BG and bring meter/ log to every clinic visit.      Follow-Up   Tfts, mac  F/u in 3 mths w/ labs prior -a1c

## 2024-08-01 ENCOUNTER — LAB VISIT (OUTPATIENT)
Dept: LAB | Facility: HOSPITAL | Age: 24
End: 2024-08-01
Attending: PHYSICIAN ASSISTANT
Payer: COMMERCIAL

## 2024-08-01 DIAGNOSIS — E10.65 TYPE 1 DIABETES MELLITUS WITH HYPERGLYCEMIA: ICD-10-CM

## 2024-08-01 LAB
T4 FREE SERPL-MCNC: 0.86 NG/DL (ref 0.71–1.51)
TSH SERPL DL<=0.005 MIU/L-ACNC: 0.79 UIU/ML (ref 0.4–4)

## 2024-08-01 PROCEDURE — 84439 ASSAY OF FREE THYROXINE: CPT | Performed by: PHYSICIAN ASSISTANT

## 2024-08-01 PROCEDURE — 84443 ASSAY THYROID STIM HORMONE: CPT | Performed by: PHYSICIAN ASSISTANT

## 2024-08-01 PROCEDURE — 36415 COLL VENOUS BLD VENIPUNCTURE: CPT | Mod: PO | Performed by: PHYSICIAN ASSISTANT

## 2024-08-06 ENCOUNTER — TELEPHONE (OUTPATIENT)
Dept: ENDOCRINOLOGY | Facility: CLINIC | Age: 24
End: 2024-08-06
Payer: COMMERCIAL

## 2024-09-13 ENCOUNTER — PATIENT OUTREACH (OUTPATIENT)
Dept: ADMINISTRATIVE | Facility: HOSPITAL | Age: 24
End: 2024-09-13
Payer: COMMERCIAL

## 2024-09-13 NOTE — LETTER
September 13, 2024    Dario Shepherd  [address] [place] [address]             [facility]  [address]  [facility] [address]  Phone: [phone] Dear Dario Shepherd,      Janell Richardson MD has reviewed your records and found that you are overdue for your diabetic eye exam.  Yearly eye exams are especially important, as this can identify early eye complications and disease caused by your diabetes.  Janell Richardson MD has requested you schedule your diabetic eye exam. Our [place] [facility] location has 2 Optometrist/Ophthalmologist,  Dr. Medina and Dr. Zaragoza.  Please call  [phone] to schedule your eye exam appointment.     If you have already completed this exam at an outside facility, please notify us so we may request a copy of the exam and update your chart.     Also, There are several other providers located in the [place] area to name a few:     [facility] [phone]  [facility] [phone]  [facility] [phone]  [facility] [phone]    [place] [facility] [phone]      [place] [facility] [phone]       If you do schedule an eye exam at an outside facility,  please let us know when and where so that we can request your records.    [facility]  Mary Anne Garcia Care Coordinator  Phone: [phone]  FAX: [phone]

## 2024-09-13 NOTE — PROGRESS NOTES
Population Health Chart Review & Patient Outreach Details      Additional Pop Health Notes:               Updates Requested / Reviewed:        Health Maintenance Topics Overdue:      VB Score: 1     Eye Exam                       Health Maintenance Topic(s) Outreach Outcomes & Actions Taken:    Eye Exam - Outreach Outcomes & Actions Taken  : letter    Lab(s) - Outreach Outcomes & Actions Taken  : Overdue Lab(s) Scheduled

## 2024-10-09 ENCOUNTER — TELEPHONE (OUTPATIENT)
Dept: FAMILY MEDICINE | Facility: CLINIC | Age: 24
End: 2024-10-09
Payer: COMMERCIAL

## 2024-10-09 NOTE — TELEPHONE ENCOUNTER
----- Message from Beth sent at 10/9/2024 12:05 PM CDT -----  Type: Needs Medical Advice  Who Called:  pt mom     Best Call Back Number: [phone] (home) [phone] (work)    Additional Information: pt mom requesting call back in regards to pt medication please advise       [id number] - [place] - [address] AT [facility] [address]  [facility] [place] [address]  Phone: [phone] Fax: [phone]

## 2024-10-09 NOTE — TELEPHONE ENCOUNTER
I called the patients mother and informed her that we cannot discuss the patients care without an involvement in care form allowing us to release information to her. She understood and said she would relay message to patient.

## 2024-10-17 ENCOUNTER — TELEPHONE (OUTPATIENT)
Dept: FAMILY MEDICINE | Facility: CLINIC | Age: 24
End: 2024-10-17

## 2024-10-17 NOTE — TELEPHONE ENCOUNTER
----- Message from Beth sent at 10/17/2024 10:39 AM CDT -----  Type: Needs Medical Advice  Who Called:  pt     Best Call Back Number: [phone] (home) [phone] (work)    Additional Information: pt states he went to pharmacy to  insulin and was told he cannot refill until next week but needs it within a few days please advise       [id number] - [place] - [address]  [facility] [place] [address]  Phone: [phone] Fax: [phone]

## 2024-10-23 ENCOUNTER — TELEPHONE (OUTPATIENT)
Dept: FAMILY MEDICINE | Facility: CLINIC | Age: 24
End: 2024-10-23
Payer: COMMERCIAL

## 2024-10-23 ENCOUNTER — LAB VISIT (OUTPATIENT)
Dept: LAB | Facility: HOSPITAL | Age: 24
End: 2024-10-23
Attending: PHYSICIAN ASSISTANT
Payer: COMMERCIAL

## 2024-10-23 DIAGNOSIS — E10.65 TYPE 1 DIABETES MELLITUS WITH HYPERGLYCEMIA: ICD-10-CM

## 2024-10-23 LAB
ESTIMATED AVG GLUCOSE: 192 MG/DL (ref 68–131)
HBA1C MFR BLD: 8.3 % (ref 4–5.6)

## 2024-10-23 PROCEDURE — 36415 COLL VENOUS BLD VENIPUNCTURE: CPT | Mod: PO | Performed by: PHYSICIAN ASSISTANT

## 2024-10-23 PROCEDURE — 83036 HEMOGLOBIN GLYCOSYLATED A1C: CPT | Performed by: PHYSICIAN ASSISTANT

## 2024-10-23 RX ORDER — INSULIN PUMP CONTROLLER
1 EACH MISCELLANEOUS
Qty: 5 EACH | Refills: 0 | Status: SHIPPED | OUTPATIENT
Start: 2024-10-23

## 2024-10-23 NOTE — TELEPHONE ENCOUNTER
Spoke with patient.   Prescription  for Omni Pods has been sent to [facility] for emergency prescription.

## 2024-11-26 NOTE — PROGRESS NOTES
"[facility]   OFFICE VISIT NOTE    Patient Name: Dario Shepherd  YOB: 2000    PRESENTING HISTORY     History of Present Illness:  Mr. Dario Shepherd is a 23 y.o. male   Last follow up May 2024    History of Present Illness    CHIEF COMPLAINT:  Patient presents today for diabetes management follow-up.    DIABETES MANAGEMENT:  He had a visit with endocrinology in August   He uses Humalog through an insulin pump for diabetes management, with Lantus as a backup in case of pump failure. He has been prescribed glucagon for emergencies. He expresses a preference not to use a continuous glucose monitor.     PREVENTIVE CARE:  His last eye exam was about eight months ago, and he goes for eye exams annually. He has not received flu or pneumococcal vaccines.    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       Review of Systems   Constitutional:  Negative for chills and fever.   Respiratory:  Negative for shortness of breath.    Cardiovascular:  Negative for chest pain.   Gastrointestinal:  Negative for abdominal pain, constipation, diarrhea, nausea and vomiting.   Endocrine:        Denies any hypoglycemic event           OBJECTIVE:   Vital Signs:  Vitals:    11/27/24 0722   BP: 118/66   Pulse: 86   Resp: 16   SpO2: 97%   Weight: 77.8 kg (171 lb 8.3 oz)   Height: 5' 9" (1.753 m)           Physical Exam  Constitutional:       General: He is not in acute distress.     Appearance: He is not ill-appearing or toxic-appearing.   HENT:      Head: Normocephalic and atraumatic.      Mouth/Throat:      Mouth: Mucous membranes are moist.      Pharynx: Uvula midline. No pharyngeal swelling.   Cardiovascular:      Rate and Rhythm: Normal rate and regular rhythm.   Pulmonary:      Effort: Pulmonary effort is normal. No tachypnea, bradypnea, accessory muscle usage, prolonged expiration or respiratory distress.      Breath sounds: Normal breath sounds. No stridor. No wheezing, rhonchi or rales. "   Abdominal:      General: There is no distension.      Tenderness: There is no abdominal tenderness. There is no guarding.      Comments: Insulin pump located in RLQ    Neurological:      General: No focal deficit present.      Mental Status: He is alert.   Psychiatric:         Mood and Affect: Mood normal.         Behavior: Behavior normal.         ASSESSMENT & PLAN:     Type 1 diabetes mellitus with hyperglycemia  - Reviewed patient's current diabetes management, including insulin regimen via pump (Humalog) and backup long-acting insulin (Lantus).  - Noted patient's preference to not use continuous glucose monitor.  - Educated on importance of annual eye exams for diabetic patients.  - Continue the current regimen     Routine general medical examination at a health care facility  -     CBC Auto Differential; Future; Expected date: 11/27/2024  -     Comprehensive Metabolic Panel; Future; Expected date: 11/27/2024  -     Lipid Panel; Future; Expected date: 11/27/2024    Vaccine counseling  - Explained rationale for flu and pneumococcal vaccine recommendations in diabetic patients.  - Recommend getting flu vaccine during flu season (September to April).  - Recommend pneumococcal vaccine for protection against Streptococcus pneumoniae.    FOLLOW-UP:  - Follow up in 1 year, during Thanksgiving week.  - Contact the office sooner if experiencing any health issues   - Labs to be drawn with labs ordered by endocrinology provider         Janell Richardson MD  Family Medicine  [facility]     This note was created using [employer] voice recognition software that occasionally misinterprets phrases or words

## 2024-11-27 ENCOUNTER — OFFICE VISIT (OUTPATIENT)
Dept: FAMILY MEDICINE | Facility: CLINIC | Age: 24
End: 2024-11-27
Payer: COMMERCIAL

## 2024-11-27 ENCOUNTER — PATIENT OUTREACH (OUTPATIENT)
Dept: ADMINISTRATIVE | Facility: HOSPITAL | Age: 24
End: 2024-11-27
Payer: COMMERCIAL

## 2024-11-27 VITALS
WEIGHT: 171.5 LBS | OXYGEN SATURATION: 97 % | BODY MASS INDEX: 25.4 KG/M2 | DIASTOLIC BLOOD PRESSURE: 66 MMHG | SYSTOLIC BLOOD PRESSURE: 118 MMHG | RESPIRATION RATE: 16 BRPM | HEART RATE: 86 BPM | HEIGHT: 69 IN

## 2024-11-27 DIAGNOSIS — Z71.85 VACCINE COUNSELING: ICD-10-CM

## 2024-11-27 DIAGNOSIS — Z00.00 ROUTINE GENERAL MEDICAL EXAMINATION AT A HEALTH CARE FACILITY: ICD-10-CM

## 2024-11-27 DIAGNOSIS — E10.65 TYPE 1 DIABETES MELLITUS WITH HYPERGLYCEMIA: Primary | ICD-10-CM

## 2024-11-27 PROCEDURE — 99999 PR PBB SHADOW E&M-EST. PATIENT-LVL IV: CPT | Mod: PBBFAC,,, | Performed by: STUDENT IN AN ORGANIZED HEALTH CARE EDUCATION/TRAINING PROGRAM

## 2024-11-27 RX ORDER — INSULIN GLARGINE-YFGN 100 [IU]/ML
INJECTION, SOLUTION SUBCUTANEOUS
COMMUNITY
Start: 2024-07-30 | End: 2024-11-27

## 2024-11-27 NOTE — PROGRESS NOTES
Population Health Chart Review & Patient Outreach Details      Additional [facility] Health Notes:               Updates Requested / Reviewed:      Updated Care Coordination Note, Care Everywhere, , Care Team Updated, and Immunizations Reconciliation Completed or Queried: [place]         Health Maintenance Topics Overdue:      [facility] Score: 1     Eye Exam                       Health Maintenance Topic(s) Outreach Outcomes & Actions Taken:    Eye Exam - Outreach Outcomes & Actions Taken  : External Records Requested & Care Team Updated if Applicable [facility]

## 2024-11-27 NOTE — LETTER
AUTHORIZATION FOR RELEASE OF   CONFIDENTIAL INFORMATION    Dear [facility],    We are seeing Dario Shepherd, date of birth 2000, in the clinic at [facility]. Janell Richardson MD is the patient's PCP. Dario Shepherd has an outstanding lab/procedure at the time we reviewed his chart. In order to help keep his health information updated, he has authorized us to request the following medical record(s):        Diabetic EYE EXAM     2024 or most recent      Please include the actual eye exam report along with the significant findings:    ________ No Diabetic Retinopathy  ________ Minimal Background Diabetic Retinopathy  ________ Moderate to Severe Background Diabetic Retinopathy  ________ Clinically Significant Macular Edema  ________ Proliferative Diabetic Retinopathy              Please fax records to [facility], [place], [place] Hee, [place], [phone]     If you have any questions, please contact [facility] at [phone].           Patient Name: Dario Shepherd  : 2000  Patient Phone #: [phone]              Dario Shepherd  MRN: [id number]  : 2000  Age: 23 y.o.  Sex: male         Patient/Legal Guardian Signature  This signature was collected at 2023    Dario Shepherd     Self  _______________________________   Printed Name/Relationship to Patient      Consent for Examination and Treatment: I hereby authorize the providers and employees of [facility] ([facility]) to provide medical treatment/services which includes, but is not limited to, performing and administering tests and diagnostic procedures that are deemed necessary, including, but not limited to, imaging examinations, blood tests and other laboratory procedures as may be required by the hospital, clinic, or may be ordered by my physician(s) or persons working under the general and/or special instructions of my physician(s).      I understand and agree that this consent covers all authorized persons, including but  not limited to physicians, residents, nurse practitioners, physicians' assistants, specialists, consultants, student nurses, and independently contracted physicians, who are called upon by the physician in charge, to carry out the diagnostic procedures and medical or surgical treatment.     I hereby authorize [facility] to retain or dispose of any specimens or tissue, should there be such remaining from any test or procedure.     I hereby authorize and give consent for [facility] providers and employees to take photographs, images or videotapes of such diagnostic, surgical or treatment procedures of Patient as may be required by [facility] or as may be ordered by a physician. I further acknowledge and agree that [facility] may use cameras or other devices for patient monitoring.     I am aware that the practice of medicine is not an exact science, and I acknowledge that no guarantees have been made to me as to the outcome of any tests, procedures or treatment.     Authorization for Release of Information: I understand that my insurance company and/or their agents may need information necessary to make determinations about payment/reimbursement. I hereby provide authorization to release to all insurance companies, their successors, assignees, other parties with whom they may have contracted, or others acting on their behalf, that are involved with payment for any hospital and/or clinic charges incurred by the patient, any information that they request and deem necessary for payment/reimbursement, and/or quality review.  I further authorize the release of my health information to physicians or other health care practitioners on staff who are involved in my health care now and in the future, and to other health care providers, entities, or institutions for the purpose of my continued care and treatment, including referrals.     REGISTRATION AUTHORIZATION  Form No. 26727 (Rev. 7/13/2022)       Medicare Patient's Certification and  Authorization to Release Information and Payment Request:  I certify that the information given by me in applying for payment under Title XVIII of the Social Security Act is correct. I authorize any salazar of medical or other information about me to release to the Social SecurityAdministration, or its intermediaries or carriers, any information needed for this or a related Medicare claim. I request that payment of authorized benefits be made on my behalf.     Assignment of Insurance Benefits:   I hereby authorize any and all insurance companies, health plans, defined   benefit plans, health insurers or any entity that is or may be responsible for payment of my medical expenses to pay all hospital and medical benefits now due, and to become due and payable to me under any hospital benefits, sick benefits, injury benefits or any other benefit for services rendered to me, including Major Medical Benefits, direct to [facility] and all independently contracted physicians. I assign any and all rights that I may have against any and all insurance companies, health plans, defined benefit plans, health insurers or any entity that is or may be responsible for payment of my medical expenses, including, but not limited to any right to appeal a denial of a claim, any right to bring any action, lawsuit, administrative proceeding, or other cause of action on my behalf. I specifically assign my right to pursue litigation against any and all insurance companies, health plans, defined benefit plans, health insurers or any entity that is or may be responsible for payment of my medical expenses based upon a refusal to pay charges.            E. Valuables: It is understood and agreed that [facility] is not liable for the damage to or loss of any money, jewelry,   documents, dentures, eye glasses, hearing aids, prosthetics, or other property of value.     F. Computer Equipment: I understand and agree that should I choose to use computer  equipment owned by [facility] or if I choose to access the Internet via [facility]s network, I do so at my own risk. [facility] is not responsible for any damage to my computer equipment or to any damages of any type that might arise from my loss of equipment or data.     G. Acceptance of Financial Responsibility:  I agree that in consideration of the services and   supplies that have been   or will be furnished to the patient, I am hereby obligated to pay all charges made for or on the account of the patient according to the standard rates (in effect at the time the services and supplies are delivered) established by [facility], including its Patient Financial Assistance Policy to the extent it is applicable. I understand that I am responsible for all charges, or portions thereof, not covered by insurance or other sources. Patient refunds will be distributed only after balances at all [facility] facilities are paid.     H. Communication Authorization:  I hereby authorize [facility] and its representatives, along with any billing service   or  who may work on their behalf, to contact me on   my cell phone and/or home phone using pre- recorded messages, artificial voice messages, automatic telephone dialing devices or other computer assisted technology, or by electronic      mail, text messaging, or by any other form of electronic communication. This includes, but is not limited to, appointment reminders, yearly physical exam reminders, preventive care reminders, patient campaigns, welcome calls, and calls about account balances on my account or any account on which I am listed as a guarantor. I understand I have the right to opt out of these communications at any time.      Relationship  Between  Facility and  Provider:      I understand that some, but not all, providers furnishing services to the patient are not employees or agents of [facility]. The patient is under the care and supervision of his/her attending  physician, and it is the responsibility of the facility and its nursing staff to carry out the instructions of such physicians. It is the responsibility of the patient's physician/designee to obtain the patient's informed consent, when required, for medical or surgical treatment, special diagnostic or therapeutic procedures, or hospital services rendered for the patient under the special instructions of the physician/designee.     REGISTRATION AUTHORIZATION  Form No. 16250 (Rev. 7/13/2022)      Notice of Privacy Practices: I acknowledge I have received a copy of [facility]'s Notice of Privacy Practices.     Facility  Directory: I have discussed with the organization my desire to be either included or excluded  in the facility directory in the event of my being an inpatient at an [facility] facility. I understand that if my choice is to opt-out of being identified in the facility directory that the facility will not provide any information about me such as my condition (e.g. fair, stable, etc.) or my location in the facility (e.g., room number, department).     Immunizations: [facility] shares immunization information with state sponsored health departments to help you and your doctor keep track of your immunization records. By signing, you consent to have this information shared with the health department in your state:                                [place] - LINKS ([place] Immunization Network for Kids Statewide)                                [place] - MIIX ([place] Immunization Information eXchange)                                [place] - ImmPRINT (Immunization Patient Registry with Integrated Technology)     TERM: This authorization is valid for this and subsequent care/treatment I receive at [facility] and will remain valid unless/until revoked in writing by me.     [facility]: As used in this document, [facility] means all [facility] owned and managed facilities, including, but not limited to,  all health centers, surgery centers, clinics, urgent care centers, and hospitals.         [facility] complies with applicable Federal civil rights laws and does not discriminate on the basis of race, color, national origin, age, disability, or sex.  ATENCIÓN: si habla marbin, tiene a duvall disposición servicios gratuitos de asistencia lingüística. Debbie al [phone].  CONOR Ý: N?u b?n nói Ti?ng Vi?t, có các d?ch v? h? tr? ngôn ng? mi?n phí dành cho b?n. G?i s? [phone].        REGISTRATION AUTHORIZATION  Form No. 07908 (Rev. 7/13/2022)

## 2024-11-27 NOTE — Clinical Note
Could we obtain his eye exam result from [facility]? He said it is [address] but the address is in [place], [place]. So [facility] have [address]. Thanks

## 2024-12-31 DIAGNOSIS — E10.65 TYPE 1 DIABETES MELLITUS WITH HYPERGLYCEMIA: ICD-10-CM

## 2025-01-02 RX ORDER — INSULIN PUMP CONTROLLER
1 EACH MISCELLANEOUS
Qty: 30 EACH | Refills: 3 | Status: SHIPPED | OUTPATIENT
Start: 2025-01-02

## 2025-05-20 DIAGNOSIS — E10.65 TYPE 1 DIABETES MELLITUS WITH HYPERGLYCEMIA: ICD-10-CM

## 2025-05-20 RX ORDER — INSULIN PUMP CONTROLLER
1 EACH MISCELLANEOUS
Qty: 30 EACH | Refills: 3 | Status: SHIPPED | OUTPATIENT
Start: 2025-05-20

## 2025-05-20 NOTE — TELEPHONE ENCOUNTER
Pt needs 2 emergency packs sent to [facility] because his script keeps coming up short  LOV 11/27/24  NOV 11/26/25

## 2025-05-20 NOTE — TELEPHONE ENCOUNTER
----- Message from Munira sent at 5/20/2025  8:37 AM CDT -----  Regarding: Needs refills  Type:  RX Refill RequestWho Called:  PtRefill or New Rx:  refillRX Name and Strength:  OMNI Gaye is the patient currently taking it? (ex. 1XDay):  SEE CHARTIs this a 30 day or 90 day RX:  SEE CHARTPreferred Pharmacy with phone number:  [id number] - [place], [place] - [id number] [id number]: [phone] Fax: [phone] or Mail Order:  LOCALOrdering Provider:  Adrian Call Back Number:  [phone] Information:  Pt needs 2 emergency packs sent to [facility] because his script keeps coming up short, please call to advise

## 2025-05-28 ENCOUNTER — TELEPHONE (OUTPATIENT)
Dept: FAMILY MEDICINE | Facility: CLINIC | Age: 25
End: 2025-05-28
Payer: COMMERCIAL

## 2025-05-28 DIAGNOSIS — E10.65 TYPE 1 DIABETES MELLITUS WITH HYPERGLYCEMIA: ICD-10-CM

## 2025-05-28 RX ORDER — INSULIN PUMP CONTROLLER
1 EACH MISCELLANEOUS
Qty: 30 EACH | Refills: 3 | Status: CANCELLED | OUTPATIENT
Start: 2025-05-28

## 2025-05-28 NOTE — TELEPHONE ENCOUNTER
LOV 11/24/25  NOV 11/26/25    Spoke to pt who states he is having trouble with refilling his meds. He has about 1 week left but usually has to wait 2 weeks for office to send. Advised pt he has a RX at [facility] with multiple refills. Spoke to [facility] who advised it was too early for pt to refill but he can process refill today. [facility] is currently out of stock and can have RX in stock tomorrow. Pt informed and will contact [facility]

## 2025-05-28 NOTE — TELEPHONE ENCOUNTER
----- Message from Nuzhat sent at 5/28/2025 12:18 PM CDT -----  Contact: Patient  Type:  RX Refill RequestWho Called:  PatientRefill or New Rx:  RefillRX Name and Strength:  insulin pump cart,cont inf,BT (OMNIPOD DASH PODS, GEN 4,) Crtg Preferred Pharmacy with phone number:  [facility] on [address] in [place], [facility] or Mail Order:  LocalOrdering Provider:  Dr Swartz the patient rather a call back or a response via [facility]?  Call [facility] Call Back Number:  [phone] Information:   States he needs an emergency pack today - please call - thank you

## 2025-07-10 ENCOUNTER — HOSPITAL ENCOUNTER (EMERGENCY)
Facility: HOSPITAL | Age: 25
Discharge: ANOTHER HOSPITAL | End: 2025-07-11
Attending: STUDENT IN AN ORGANIZED HEALTH CARE EDUCATION/TRAINING PROGRAM
Payer: COMMERCIAL

## 2025-07-10 DIAGNOSIS — S09.93XA FACIAL TRAUMA, INITIAL ENCOUNTER: ICD-10-CM

## 2025-07-10 DIAGNOSIS — S02.92XA MULTIPLE FACIAL FRACTURES, CLOSED, INITIAL ENCOUNTER: Primary | ICD-10-CM

## 2025-07-10 LAB
ABSOLUTE EOSINOPHIL (SMH): 0.15 K/UL
ABSOLUTE MONOCYTE (SMH): 0.92 K/UL (ref 0.3–1)
ABSOLUTE NEUTROPHIL COUNT (SMH): 6.6 K/UL (ref 1.8–7.7)
ALBUMIN SERPL-MCNC: 5 G/DL (ref 3.5–5.2)
ALP SERPL-CCNC: 49 UNIT/L (ref 55–135)
ALT SERPL-CCNC: 16 UNIT/L (ref 10–44)
ANION GAP (SMH): 10 MMOL/L (ref 8–16)
APTT PPP: 22.4 SECONDS (ref 21–32)
AST SERPL-CCNC: 18 UNIT/L (ref 10–40)
BASOPHILS # BLD AUTO: 0.03 K/UL
BASOPHILS NFR BLD AUTO: 0.3 %
BILIRUB SERPL-MCNC: 0.5 MG/DL (ref 0.1–1)
BUN SERPL-MCNC: 21 MG/DL (ref 6–20)
CALCIUM SERPL-MCNC: 10.2 MG/DL (ref 8.7–10.5)
CHLORIDE SERPL-SCNC: 102 MMOL/L (ref 95–110)
CO2 SERPL-SCNC: 29 MMOL/L (ref 23–29)
CREAT SERPL-MCNC: 1 MG/DL (ref 0.5–1.4)
ERYTHROCYTE [DISTWIDTH] IN BLOOD BY AUTOMATED COUNT: 12.1 % (ref 11.5–14.5)
GFR SERPLBLD CREATININE-BSD FMLA CKD-EPI: >60 ML/MIN/1.73/M2
GLUCOSE SERPL-MCNC: 118 MG/DL (ref 70–110)
HCT VFR BLD AUTO: 45.2 % (ref 40–54)
HGB BLD-MCNC: 16.1 GM/DL (ref 14–18)
IMM GRANULOCYTES # BLD AUTO: 0.07 K/UL (ref 0–0.04)
IMM GRANULOCYTES NFR BLD AUTO: 0.7 % (ref 0–0.5)
INR PPP: 1 (ref 0.8–1.2)
LYMPHOCYTES # BLD AUTO: 2.84 K/UL (ref 1–4.8)
MCH RBC QN AUTO: 30.7 PG (ref 27–31)
MCHC RBC AUTO-ENTMCNC: 35.6 G/DL (ref 32–36)
MCV RBC AUTO: 86 FL (ref 82–98)
NUCLEATED RBC (/100WBC) (SMH): 0 /100 WBC
PLATELET # BLD AUTO: 278 K/UL (ref 150–450)
PMV BLD AUTO: 10.7 FL (ref 9.2–12.9)
POCT GLUCOSE: 119 MG/DL (ref 70–110)
POCT GLUCOSE: 126 MG/DL (ref 70–110)
POTASSIUM SERPL-SCNC: 3.7 MMOL/L (ref 3.5–5.1)
PROT SERPL-MCNC: 7.6 GM/DL (ref 6–8.4)
PROTHROMBIN TIME: 11.3 SECONDS (ref 9–12.5)
RBC # BLD AUTO: 5.25 M/UL (ref 4.6–6.2)
RELATIVE EOSINOPHIL (SMH): 1.4 % (ref 0–8)
RELATIVE LYMPHOCYTE (SMH): 26.8 % (ref 18–48)
RELATIVE MONOCYTE (SMH): 8.7 % (ref 4–15)
RELATIVE NEUTROPHIL (SMH): 62.1 % (ref 38–73)
SODIUM SERPL-SCNC: 141 MMOL/L (ref 136–145)
WBC # BLD AUTO: 10.58 K/UL (ref 3.9–12.7)

## 2025-07-10 PROCEDURE — 80053 COMPREHEN METABOLIC PANEL: CPT | Performed by: STUDENT IN AN ORGANIZED HEALTH CARE EDUCATION/TRAINING PROGRAM

## 2025-07-10 PROCEDURE — 82962 GLUCOSE BLOOD TEST: CPT

## 2025-07-10 PROCEDURE — 96375 TX/PRO/DX INJ NEW DRUG ADDON: CPT

## 2025-07-10 PROCEDURE — 99285 EMERGENCY DEPT VISIT HI MDM: CPT | Mod: 25

## 2025-07-10 PROCEDURE — 36415 COLL VENOUS BLD VENIPUNCTURE: CPT | Performed by: STUDENT IN AN ORGANIZED HEALTH CARE EDUCATION/TRAINING PROGRAM

## 2025-07-10 PROCEDURE — 96376 TX/PRO/DX INJ SAME DRUG ADON: CPT

## 2025-07-10 PROCEDURE — 85610 PROTHROMBIN TIME: CPT | Performed by: STUDENT IN AN ORGANIZED HEALTH CARE EDUCATION/TRAINING PROGRAM

## 2025-07-10 PROCEDURE — 25000003 PHARM REV CODE 250: Performed by: STUDENT IN AN ORGANIZED HEALTH CARE EDUCATION/TRAINING PROGRAM

## 2025-07-10 PROCEDURE — 85730 THROMBOPLASTIN TIME PARTIAL: CPT | Performed by: STUDENT IN AN ORGANIZED HEALTH CARE EDUCATION/TRAINING PROGRAM

## 2025-07-10 PROCEDURE — 85025 COMPLETE CBC W/AUTO DIFF WBC: CPT | Performed by: STUDENT IN AN ORGANIZED HEALTH CARE EDUCATION/TRAINING PROGRAM

## 2025-07-10 PROCEDURE — 63600175 PHARM REV CODE 636 W HCPCS: Performed by: STUDENT IN AN ORGANIZED HEALTH CARE EDUCATION/TRAINING PROGRAM

## 2025-07-10 PROCEDURE — 96374 THER/PROPH/DIAG INJ IV PUSH: CPT

## 2025-07-10 RX ORDER — MORPHINE SULFATE 4 MG/ML
4 INJECTION, SOLUTION INTRAMUSCULAR; INTRAVENOUS
Refills: 0 | Status: COMPLETED | OUTPATIENT
Start: 2025-07-10 | End: 2025-07-10

## 2025-07-10 RX ORDER — TETRACAINE HYDROCHLORIDE 5 MG/ML
2 SOLUTION OPHTHALMIC
Status: COMPLETED | OUTPATIENT
Start: 2025-07-10 | End: 2025-07-10

## 2025-07-10 RX ORDER — ONDANSETRON HYDROCHLORIDE 2 MG/ML
4 INJECTION, SOLUTION INTRAVENOUS
Status: COMPLETED | OUTPATIENT
Start: 2025-07-10 | End: 2025-07-10

## 2025-07-10 RX ORDER — FLUORESCEIN SODIUM 1 MG/MG
1 STRIP OPHTHALMIC
Status: COMPLETED | OUTPATIENT
Start: 2025-07-10 | End: 2025-07-10

## 2025-07-10 RX ADMIN — TETRACAINE HYDROCHLORIDE 2 DROP: 5 SOLUTION OPHTHALMIC at 10:07

## 2025-07-10 RX ADMIN — ONDANSETRON 4 MG: 2 INJECTION INTRAMUSCULAR; INTRAVENOUS at 08:07

## 2025-07-10 RX ADMIN — MORPHINE SULFATE 4 MG: 4 INJECTION, SOLUTION INTRAMUSCULAR; INTRAVENOUS at 10:07

## 2025-07-10 RX ADMIN — FLUORESCEIN SODIUM 1 EACH: 1 STRIP OPHTHALMIC at 10:07

## 2025-07-10 RX ADMIN — MORPHINE SULFATE 4 MG: 4 INJECTION, SOLUTION INTRAMUSCULAR; INTRAVENOUS at 08:07

## 2025-07-10 RX ADMIN — ONDANSETRON 4 MG: 2 INJECTION INTRAMUSCULAR; INTRAVENOUS at 10:07

## 2025-07-11 ENCOUNTER — PATIENT MESSAGE (OUTPATIENT)
Dept: ADMINISTRATIVE | Facility: HOSPITAL | Age: 25
End: 2025-07-11
Payer: COMMERCIAL

## 2025-07-11 VITALS
WEIGHT: 160 LBS | HEART RATE: 57 BPM | SYSTOLIC BLOOD PRESSURE: 151 MMHG | RESPIRATION RATE: 20 BRPM | DIASTOLIC BLOOD PRESSURE: 66 MMHG | OXYGEN SATURATION: 95 % | BODY MASS INDEX: 22.9 KG/M2 | HEIGHT: 70 IN | TEMPERATURE: 99 F

## 2025-07-11 PROCEDURE — 96376 TX/PRO/DX INJ SAME DRUG ADON: CPT

## 2025-07-11 PROCEDURE — 63600175 PHARM REV CODE 636 W HCPCS: Performed by: STUDENT IN AN ORGANIZED HEALTH CARE EDUCATION/TRAINING PROGRAM

## 2025-07-11 PROCEDURE — 96375 TX/PRO/DX INJ NEW DRUG ADDON: CPT

## 2025-07-11 RX ORDER — ONDANSETRON HYDROCHLORIDE 2 MG/ML
4 INJECTION, SOLUTION INTRAVENOUS
Status: COMPLETED | OUTPATIENT
Start: 2025-07-11 | End: 2025-07-11

## 2025-07-11 RX ORDER — FENTANYL CITRATE 50 UG/ML
75 INJECTION, SOLUTION INTRAMUSCULAR; INTRAVENOUS
Refills: 0 | Status: COMPLETED | OUTPATIENT
Start: 2025-07-11 | End: 2025-07-11

## 2025-07-11 RX ADMIN — ONDANSETRON 4 MG: 2 INJECTION INTRAMUSCULAR; INTRAVENOUS at 01:07

## 2025-07-11 RX ADMIN — FENTANYL CITRATE 75 MCG: 50 INJECTION INTRAMUSCULAR; INTRAVENOUS at 12:07

## 2025-07-11 NOTE — ED PROVIDER NOTES
Encounter Date: 7/10/2025       History     Chief Complaint   Patient presents with    Facial Injury     Pt had baseball hit L eye     HPI    Dario Shepherd is a 24 year old male patient who presents to the ED with facial injury s/p being struck by baseball in face at high rate of speed.  Patient was struck in his left eye.  Patient reports that he did not lose consciousness.  He does report that he immediately after the impact.  He did report that his vision initially was on in his left eye, but reports it turned shortly afterwards, but has been blurred since.  He reports that he can make out shapes and lights, but can not make out details.  He does report that he normally wears a contact in that eye and feels like the contact his out of place or crumpled.  He reports continued pain in that eye.  However he does report that he is able to move his eye without issue.  Reports a headache, without lightheadedness, dizziness, syncope.  Does report some ongoing nausea.  No other injuries reported.    Review of patient's allergies indicates:  No Known Allergies  Past Medical History:   Diagnosis Date    Asthma     Cardiac murmur     Diabetes mellitus     Diabetes mellitus type I     Diabetes mellitus, new onset 09/29/2013    Gastroenteritis 12/30/2013     Past Surgical History:   Procedure Laterality Date    CIRCUMCISION      TONSILLECTOMY, ADENOIDECTOMY       Family History   Problem Relation Name Age of Onset    Diabetes Father      Hypertension Father      Other Father          ITP    Mitral valve prolapse Mother      Hypertension Mother      Hyperlipidemia Mother      Seizures Brother      Other Paternal Uncle      Thyroid disease Maternal Grandmother      Hypertension Maternal Grandmother      Diabetes Paternal Grandfather      Hypertension Maternal Aunt      Thyroid disease Paternal Aunt      Rheumatologic disease Paternal Grandmother       Social History[1]  Review of Systems   HENT:  Positive for facial swelling.     Eyes:  Positive for photophobia, pain, redness and visual disturbance.   Respiratory:  Negative for cough and shortness of breath.    Cardiovascular:  Negative for chest pain and palpitations.   Gastrointestinal:  Positive for nausea and vomiting. Negative for abdominal pain, constipation and diarrhea.   Musculoskeletal:  Negative for back pain and neck pain.   Skin:  Positive for wound.   Neurological:  Positive for headaches. Negative for dizziness, seizures, syncope, facial asymmetry, speech difficulty, weakness, light-headedness and numbness.   Psychiatric/Behavioral:  Negative for confusion.      Physical Exam     Initial Vitals [07/10/25 1916]   BP Pulse Resp Temp SpO2   122/76 94 16 98.5 °F (36.9 °C) 100 %      MAP       --         Physical Exam    Nursing note and vitals reviewed.  HENT:   Head: Normocephalic.   Right Ear: External ear normal.   Left Ear: External ear normal. Mouth/Throat: Oropharynx is clear and moist.   Left periorbital swelling and ecchymosis.  2 cm laceration inferior to left eye.  Dried blood in left naris without septal hematoma noted.   Eyes: EOM are normal. Right eye exhibits discharge. Left eye exhibits no discharge. No scleral icterus.   Right pupil 2 mm and reactive, left pupil fixed and dilated.  20/20 in right eye with contact lens in. 20/200 in left eye.  Patient reports that he can make out shapes and light, but reports that everything else is blurry in the left eye.  Conjunctival hemorrhage on lateral side of left eye.  Increased fluorescein uptake on inferior lateral cornea, no Luis sign noted.   Neck: Neck supple.   No midline spinal tenderness to palpation.   Normal range of motion.  Cardiovascular:  Normal rate, regular rhythm, normal heart sounds and intact distal pulses.           No murmur heard.  Pulmonary/Chest: Breath sounds normal. No respiratory distress. He exhibits no tenderness.   Abdominal: Abdomen is soft. He exhibits no distension. There is no abdominal  tenderness. There is no rebound and no guarding.   Musculoskeletal:         General: Normal range of motion.      Cervical back: Normal range of motion and neck supple.     Neurological: He is alert and oriented to person, place, and time. GCS score is 15. GCS eye subscore is 4. GCS verbal subscore is 5. GCS motor subscore is 6.   Moving all extremities equally, 5/5 strength in BUE and BLE.   Skin: Skin is warm and dry.         ED Course   Procedures  Labs Reviewed   COMPREHENSIVE METABOLIC PANEL - Abnormal       Result Value    Sodium 141      Potassium 3.7      Chloride 102      CO2 29      Glucose 118 (*)     BUN 21 (*)     Creatinine 1.0      Calcium 10.2      Protein Total 7.6      Albumin 5.0      Bilirubin Total 0.5      ALP 49 (*)     AST 18      ALT 16      Anion Gap 10      eGFR >60     CBC WITH DIFFERENTIAL - Abnormal    WBC 10.58      RBC 5.25      Hgb 16.1      Hct 45.2      MCV 86      MCH 30.7      MCHC 35.6      RDW 12.1      Platelet Count 278      MPV 10.7      Nucleated RBC 0      Neut % 62.1      Lymph % 26.8      Mono % 8.7      Eos % 1.4      Basophil % 0.3      Imm Grans % 0.7 (*)     Neut # 6.6      Lymph # 2.84      Mono # 0.92      Eos # 0.15      Baso # 0.03      Imm Grans # 0.07 (*)    POCT GLUCOSE - Abnormal    POCT Glucose 126 (*)    POCT GLUCOSE - Abnormal    POCT Glucose 119 (*)    PROTIME-INR - Normal    PT 11.3      INR 1.0     APTT - Normal    PTT 22.4     CBC W/ AUTO DIFFERENTIAL    Narrative:     The following orders were created for panel order CBC auto differential.  Procedure                               Abnormality         Status                     ---------                               -----------         ------                     CBC with Differential[[id number]]       Abnormal            Final result                 Please view results for these tests on the individual orders.   HEPATITIS C ANTIBODY   HEP C VIRUS HOLD SPECIMEN   HIV 1 / 2 ANTIBODY   HIV VIRUS  CONFIRMATION HOLD SPECIMEN          Imaging Results              CT Head Without Contrast (Final result)  Result time 07/10/25 20:41:04      Final result by Antonio Cunha MD (07/10/25 20:41:04)                   Impression:      1. No acute intracranial process.  2. Left facial fractures with associated left maxillary sinus disease.      Electronically signed by: Antonio Cunha  Date:    07/10/2025  Time:    20:41               Narrative:    EXAMINATION:  CT HEAD WITHOUT CONTRAST    CLINICAL HISTORY:  Blunt trauma;    TECHNIQUE:  Low dose axial CT images obtained throughout the head without intravenous contrast. Sagittal and coronal reconstructions were performed.    COMPARISON:  None.    FINDINGS:  Intracranial compartment:    Ventricles and sulci are normal in size for age without evidence of hydrocephalus. No extra-axial blood or fluid collections.    The brain parenchyma appears normal. No parenchymal mass, hemorrhage, edema or major vascular distribution infarct.    Skull/extracranial contents (limited evaluation): No fracture. Left maxillary sinus disease with blood and air-fluid level    Left nasal, infraorbital and facial fractures.  See CT face report.                                        CT Maxillofacial Without Contrast (Final result)  Result time 07/10/25 20:50:07      Final result by Antonio Cunha MD (07/10/25 20:50:07)                   Impression:      1. Acute facial fractures involving the nasal ala, inferior and medial wall of the left orbit, anterior and lateral walls of the left maxillary sinus with associated left maxillary and left ethmoid sinus disease.  See above comments.  2. See above comments.  3. This report was flagged in Epic as abnormal.      Electronically signed by: Antonio Cunha  Date:    07/10/2025  Time:    20:50               Narrative:    EXAMINATION:  CT MAXILLOFACIAL WITHOUT CONTRAST    CLINICAL HISTORY:  Blunt trauma;    TECHNIQUE:  Low dose axial images, sagittal  and coronal reformations were obtained through the face.  Contrast was not administered.    COMPARISON:  None    FINDINGS:  Acute fractures of the nasal ala bilaterally with mild comminution and mild depression on the left.    Acute fractures involving the anterior and lateral wall of the left maxillary sinus.    Acute fracture inferior left orbit with 11 mm inferior displacement, most prominent at the posterior margin of the inferior left orbit.  No extraocular entrapment is detected.    Acute fracture of the medial wall the left orbit.    Zygomatic arches are intact.    Mandible is intact.    Blood in the left maxillary sinus with air-fluid level.  Left ethmoid sinus disease also.    The globes appear within normal limits.                                       Medications   Tdap vaccine injection 0.5 mL (has no administration in time range)   ondansetron injection 4 mg (4 mg Intravenous Given 7/10/25 2032)   morphine injection 4 mg (4 mg Intravenous Given 7/10/25 2032)   fluorescein ophthalmic strip 1 each (1 each Both Eyes Given 7/10/25 2200)   TETRAcaine HCl (PF) 0.5 % Drop 2 drop (2 drops Left Eye Given 7/10/25 2200)   ondansetron injection 4 mg (4 mg Intravenous Given 7/10/25 2231)   morphine injection 4 mg (4 mg Intravenous Given 7/10/25 2232)     Medical Decision Making  Amount and/or Complexity of Data Reviewed  Labs: ordered.  Radiology: ordered.    Risk  Prescription drug management.        SALIMA Shepherd is a 24 year old male patient who presents to the ED with facial injury s/p being struck by baseball in face. Left periorbital swelling and ecchymosis.  2 cm laceration inferior to left eye. Right pupil 2 mm and reactive, left pupil fixed and dilated.  20/20 in right eye with contact lens in. 20/200 in left eye.  Patient reports that he can make out shapes and light, but reports that everything else is blurry in the left eye.  Conjunctival hemorrhage on lateral side of left eye.  Increased  fluorescein uptake on inferior lateral cornea, no Luis sign noted. Initially VSS.  During ED course became hypertensive to SBP 140s to 50s, bradycardic to 50s.  Workup included CBC, CMP, coags along with CT head and CT max/face.  Patient without leukocytosis, WBC 10.58.  H/H 16.1/45.2.  Coags WNL.  CMP without gross abnormalities.  CT head without intracranial pathology. CT max/face with the following findings:     Acute fractures of the nasal ala bilaterally with mild comminution and mild depression on the left.  Acute fractures involving the anterior and lateral wall of the left maxillary sinus.  Acute fracture inferior left orbit with 11 mm inferior displacement, most prominent at the posterior margin of the inferior left orbit.  No extraocular entrapment is detected.  Acute fracture of the medial wall the left orbit.  Blood in the left maxillary sinus with air-fluid level.   Zygomatic arches and mandible are intact.  The globes appear within normal limits.    Given multiple rounds of pain control and antiemetic.  Patient to be transferred to facility with OMFS and ophthalmology services for further evaluation.    Janet Mi MD  PGY4, [facility]   07/11/2025 12:11 AM                                    Clinical Impression:  Final diagnoses:  [S02.92XA] Multiple facial fractures, closed, initial encounter (Primary)  [S09.93XA] Facial trauma, initial encounter          ED Disposition Condition    Transfer to Another Facility Stable                    Janet Mi MD  07/11/25 0049         [1]   Social History  Tobacco Use    Smoking status: Never     Passive exposure: Never    Smokeless tobacco: Never   Substance Use Topics    Alcohol use: Yes     Alcohol/week: 2.0 standard drinks of alcohol     Types: 2 Cans of beer per week    Drug use: No        Janet Mi MD  07/11/25 [id number]

## 2025-07-13 LAB — HOLD SPECIMEN: NORMAL

## 2025-08-18 ENCOUNTER — RESEARCH ENCOUNTER (OUTPATIENT)
Dept: RESEARCH | Facility: HOSPITAL | Age: 25
End: 2025-08-18
Payer: COMMERCIAL